# Patient Record
Sex: FEMALE | Race: WHITE | NOT HISPANIC OR LATINO | URBAN - METROPOLITAN AREA
[De-identification: names, ages, dates, MRNs, and addresses within clinical notes are randomized per-mention and may not be internally consistent; named-entity substitution may affect disease eponyms.]

---
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
## 2022-02-11 VITALS
HEART RATE: 72 BPM | HEIGHT: 70 IN | DIASTOLIC BLOOD PRESSURE: 66 MMHG | SYSTOLIC BLOOD PRESSURE: 122 MMHG | WEIGHT: 156.2 LBS | BODY MASS INDEX: 22.36 KG/M2

## 2022-02-16 NOTE — PROGRESS NOTES
Patient:   JENIFFER GARCIA            MRN: [id number]            FIN: [id number]               Age:   16 years     Sex:  Female     :  2000   Associated Diagnoses:   Sports physical   Author:   Padilla Mortensen MD      Impression and Plan   Diagnosis     Sports physical ([id number] Z02.5).     Plan:  No restrictions or special recommendations for school athletic participation.    Orders     Orders   Charges (Evaluation and Management):  04724 periodic preventive med est patient 18-39 yrs (Charge) (Order): Quantity: 1, Sports physical.        Visit Information      Date of Service: 2017 02:00 pm  Performing Location: [facility]  Encounter#: [id number]      Primary Care Provider (PCP):  Not recorded.      Referring Provider:  No referring provider recorded for selected visit.   Visit type:  Annual exam.    Accompanied by:  No one.    Source of history:  Self.    History limitation:  None.       Chief Complaint   Chief complaint discussed and confirmed correct.     3/16/2017 2:05 PM CDT    Pt here for sports px        History of Present Illness   See History form filled out by parent (scanned to EMR)      Review of Systems   Constitutional:  Negative.    Eye:  Negative.    Ear/Nose/Mouth/Throat:  Negative.    Respiratory:  Negative.    Cardiovascular:  Negative.    Gastrointestinal:  Negative.    Genitourinary:  Negative.    Hematology/Lymphatics:  Negative.    Endocrine:  Negative.    Immunologic:  Negative.    Musculoskeletal:  Negative.    Integumentary:  Negative.    Neurologic:  Negative.    Psychiatric:  Negative.           All other systems reviewed and negative      Health Status   Allergies:    Allergic Reactions (Selected)  No known allergies   Medications:  (Selected)      Problem list:    No problem items selected or recorded.      Histories   Past Medical History:    No active or resolved past medical history items have been selected or recorded.   Family History:    No family  history items have been selected or recorded.   Procedure history:    No active procedure history items have been selected or recorded.   Social History:             No active social history items have been recorded.      Physical Examination   Vital signs reviewed  and within acceptable limits    Vital Signs   3/16/2017 2:05 PM CDT Peripheral Pulse Rate 72 bpm    Pulse Site Radial artery    HR Method Manual    Systolic Blood Pressure 122 mmHg    Diastolic Blood Pressure 66 mmHg    Mean Arterial Pressure 85 mmHg    BP Site Left arm    BP Method Manual      Measurements from flowsheet : Measurements   3/16/2017 2:05 PM CDT Height Measured - Standard 72 in    Weight Measured - Standard 156.2 lb    BSA 1.9 m2    Body Mass Index 21.18 kg/m2    Body Mass Index Percentile 47.45      General:  Alert and oriented, No acute distress.    Eye:  Pupils are equal, round and reactive to light, Extraocular movements are intact, Normal conjunctiva.    HENT:  Normocephalic, Tympanic membranes are clear, Normal hearing, Oral mucosa is moist, No pharyngeal erythema.    Neck:  Supple, Non-tender, No carotid bruit, No jugular venous distention, No lymphadenopathy, No thyromegaly.    Respiratory:  Lungs are clear to auscultation, Respirations are non-labored, Breath sounds are equal, Symmetrical chest wall expansion, No chest wall tenderness.    Cardiovascular:  Normal rate, Regular rhythm, No murmur, No gallop, Good pulses equal in all extremities, Normal peripheral perfusion, No edema.    Gastrointestinal:  Soft, Non-tender, Non-distended, Normal bowel sounds, No organomegaly.    Lymphatics:  No lymphadenopathy neck, axilla, groin.    Musculoskeletal:  Normal range of motion, Normal strength, No tenderness, No swelling, No deformity, Normal gait.    Integumentary:  Warm, Dry, Pink.    Neurologic:  Normal sensory, Normal motor function, No focal deficits, Cranial Nerves II-XII are grossly intact, Normal deep tendon reflexes.     Cognition and Speech:  Oriented, Speech clear and coherent, Functional cognition intact.    Psychiatric:  Cooperative, Appropriate mood & affect, Normal judgment.